# Patient Record
Sex: FEMALE | URBAN - METROPOLITAN AREA
[De-identification: names, ages, dates, MRNs, and addresses within clinical notes are randomized per-mention and may not be internally consistent; named-entity substitution may affect disease eponyms.]

---

## 2021-10-05 ENCOUNTER — WEB ENCOUNTER (OUTPATIENT)
Dept: URBAN - METROPOLITAN AREA CLINIC 37 | Facility: CLINIC | Age: 51
End: 2021-10-05

## 2021-10-21 PROBLEM — 235871004 HEPATITIS B CARRIER: Status: ACTIVE | Noted: 2021-10-21

## 2021-11-03 ENCOUNTER — TELEPHONE ENCOUNTER (OUTPATIENT)
Dept: URBAN - METROPOLITAN AREA CLINIC 35 | Facility: CLINIC | Age: 51
End: 2021-11-03

## 2021-11-04 ENCOUNTER — LAB OUTSIDE AN ENCOUNTER (OUTPATIENT)
Dept: URBAN - METROPOLITAN AREA CLINIC 37 | Facility: CLINIC | Age: 51
End: 2021-11-04

## 2021-11-04 ENCOUNTER — OFFICE VISIT (OUTPATIENT)
Dept: URBAN - METROPOLITAN AREA CLINIC 37 | Facility: CLINIC | Age: 51
End: 2021-11-04

## 2021-11-04 VITALS
WEIGHT: 92 LBS | OXYGEN SATURATION: 98 % | SYSTOLIC BLOOD PRESSURE: 98 MMHG | HEIGHT: 60 IN | HEART RATE: 81 BPM | DIASTOLIC BLOOD PRESSURE: 72 MMHG | BODY MASS INDEX: 18.06 KG/M2

## 2021-11-04 PROBLEM — 275978004 SCREENING FOR MALIGNANT NEOPLASM OF COLON: Status: ACTIVE | Noted: 2021-11-04

## 2021-11-04 RX ORDER — EMPAGLIFLOZIN 10 MG/1
1 TABLET TABLET, FILM COATED ORAL ONCE A DAY
Qty: 30 | Status: ACTIVE | COMMUNITY

## 2021-11-04 RX ORDER — POLYETHYLENE GLYCOL 3350, SODIUM SULFATE, SODIUM CHLORIDE, POTASSIUM CHLORIDE, ASCORBIC ACID, SODIUM ASCORBATE 140-9-5.2G
AS DIRECTED KIT ORAL ONCE
Qty: 1 KIT | Refills: 0 | OUTPATIENT
Start: 2021-11-04

## 2021-11-04 RX ORDER — ENTECAVIR 0.5 MG/1
1 TABLET ON AN EMPTY STOMACH TABLET, FILM COATED ORAL ONCE A DAY
Qty: 10 | Status: ACTIVE | COMMUNITY

## 2021-11-04 RX ORDER — PRAVASTATIN SODIUM 40 MG/1
1 TABLET TABLET ORAL QHS
Status: ACTIVE | COMMUNITY

## 2021-11-04 NOTE — HPI-MIGRATED HPI
Colorectal cancer screening : Patients denies -> rectal bleeding, change in bowel habits, constipation, diarrhea, or abdominal pain;   Colorectal cancer screening : Current bowel movement patterns -> 1 formed BM daily;   Colorectal cancer screening : Patient is here for initial consultation for -> first time screening colonoscopy;   Colorectal cancer screening : Family history of GI malignancy: -> Denies;   Hep- B : Patient currently has been on -> Entecavir 0.5MG daily since 10/2008;   Hep- B : Family history of liver or GI malignancy -> denies;   Hep- B : Patient is here for -> Initial consultation for hepatitis B;   Hep- B : Diagnosis was made -> years ago on routine labs.  Patient was previously following up with PCP, Dr. Fe Balderas but he retired and started following up with Dr. Colin Castro;   Hep- B : Patient denies any symptoms of -> jaundice, ascites/abd. distension, peripheral edema/swelling in legs, hematemesis or melena, weight loss, change in stool color, shortness of breath, fever, fatigue, depression/Anxiety, nausea/vomiting;   Hep- B : Prior Imaging studies showed: -> No cirrhosis on previous US (several years ago);   Hep- B : Patient denies/admits using supplemental herbal -> denies using supplemental herbal products;   Initial consultation : Patient is here for -> GERD Onset of symptoms: a few months ago Patient reports heartburn/acid reflux symptoms Aggravating factors: worse when she skips meals Associated symptoms: Patient denies early satiety, nausea, vomiting, changes in BM, constipation, diarrhea or dysphagia/odynophagia.  Medications tried: Patient hasn't tried any OTC antacids/PPI Tests/evaluations done previously: Patient denies prior EGD ;   Interim investigations : Labs done on: ->  * 09/05/2020 with Dr. Fe Balderas:  - Lipid: Cholesterol, total: 190; HDL: 66; TrigkyL 76; LDL: 107 (H);  - CMP: Glu: 255 (H); BUN: 12; Cr: 0.60; Na; 135; K: 4.7; Alb: 4.2; ALT: 11; AST: 14; ALP: 75; Tbili: 0.6 - Hgb A1c 11.1 (H) - CBC: WBC: 5.0; RBC: 4.81; Hgb: 14.4; Hct: 44.2; Plt: 312 - HBV DNA: &lt; 10 IU/mL;     Colorectal cancer screening : Denies dysphagia or odynophagia Currently taking anticoagulants/NSAIDs: Denies;

## 2021-11-24 ENCOUNTER — OFFICE VISIT (OUTPATIENT)
Dept: URBAN - METROPOLITAN AREA SURGERY CENTER 8 | Facility: SURGERY CENTER | Age: 51
End: 2021-11-24

## 2021-12-03 ENCOUNTER — OFFICE VISIT (OUTPATIENT)
Dept: URBAN - METROPOLITAN AREA SURGERY CENTER 8 | Facility: SURGERY CENTER | Age: 51
End: 2021-12-03

## 2021-12-28 ENCOUNTER — OFFICE VISIT (OUTPATIENT)
Dept: URBAN - METROPOLITAN AREA CLINIC 38 | Facility: CLINIC | Age: 51
End: 2021-12-28
Payer: COMMERCIAL

## 2021-12-28 DIAGNOSIS — B18.1 CARRIER OF HEPATITIS B: ICD-10-CM

## 2021-12-28 PROCEDURE — 76700 US EXAM ABDOM COMPLETE: CPT | Performed by: INTERNAL MEDICINE

## 2021-12-29 PROBLEM — 235595009: Status: ACTIVE | Noted: 2021-11-04

## 2021-12-29 PROBLEM — 428283002 HISTORY OF POLYP OF COLON (SITUATION): Status: ACTIVE | Noted: 2021-12-29

## 2021-12-29 PROBLEM — 196731005 GASTRODUODENITIS: Status: ACTIVE | Noted: 2021-12-29

## 2021-12-30 ENCOUNTER — OFFICE VISIT (OUTPATIENT)
Dept: URBAN - METROPOLITAN AREA CLINIC 37 | Facility: CLINIC | Age: 51
End: 2021-12-30
Payer: COMMERCIAL

## 2021-12-30 ENCOUNTER — LAB OUTSIDE AN ENCOUNTER (OUTPATIENT)
Dept: URBAN - METROPOLITAN AREA CLINIC 37 | Facility: CLINIC | Age: 51
End: 2021-12-30

## 2021-12-30 VITALS — HEIGHT: 60 IN | WEIGHT: 91 LBS | BODY MASS INDEX: 17.87 KG/M2

## 2021-12-30 DIAGNOSIS — K29.70 GASTRITIS, UNSPECIFIED, WITHOUT BLEEDING: ICD-10-CM

## 2021-12-30 DIAGNOSIS — K63.5 POLYP OF COLON: ICD-10-CM

## 2021-12-30 DIAGNOSIS — Z86.010 PERSONAL HISTORY OF COLONIC POLYPS: ICD-10-CM

## 2021-12-30 DIAGNOSIS — K64.0 FIRST DEGREE HEMORRHOIDS: ICD-10-CM

## 2021-12-30 DIAGNOSIS — B18.1 CHRONIC VIRAL HEPATITIS B WITHOUT DELTA-AGENT: ICD-10-CM

## 2021-12-30 PROCEDURE — 99213 OFFICE O/P EST LOW 20 MIN: CPT | Performed by: INTERNAL MEDICINE

## 2021-12-30 RX ORDER — PRAVASTATIN SODIUM 40 MG/1
1 TABLET TABLET ORAL QHS
Status: ACTIVE | COMMUNITY

## 2021-12-30 RX ORDER — POLYETHYLENE GLYCOL 3350, SODIUM SULFATE, SODIUM CHLORIDE, POTASSIUM CHLORIDE, ASCORBIC ACID, SODIUM ASCORBATE 140-9-5.2G
AS DIRECTED KIT ORAL ONCE
Qty: 1 KIT | Refills: 0 | Status: ON HOLD | COMMUNITY
Start: 2021-11-04

## 2021-12-30 RX ORDER — EMPAGLIFLOZIN 10 MG/1
1 TABLET TABLET, FILM COATED ORAL ONCE A DAY
Qty: 30 | Status: ACTIVE | COMMUNITY

## 2021-12-30 RX ORDER — ENTECAVIR 0.5 MG/1
1 TABLET ON AN EMPTY STOMACH TABLET, FILM COATED ORAL ONCE A DAY
Qty: 10 | Status: ACTIVE | COMMUNITY

## 2022-04-21 PROBLEM — 186639003 CHRONIC VIRAL HEPATITIS B WITHOUT DELTA-AGENT: Status: ACTIVE | Noted: 2021-11-03

## 2022-06-07 ENCOUNTER — OFFICE VISIT (OUTPATIENT)
Dept: URBAN - METROPOLITAN AREA CLINIC 38 | Facility: CLINIC | Age: 52
End: 2022-06-07

## 2022-06-30 ENCOUNTER — LAB OUTSIDE AN ENCOUNTER (OUTPATIENT)
Dept: URBAN - METROPOLITAN AREA CLINIC 37 | Facility: CLINIC | Age: 52
End: 2022-06-30

## 2022-06-30 ENCOUNTER — OFFICE VISIT (OUTPATIENT)
Dept: URBAN - METROPOLITAN AREA CLINIC 37 | Facility: CLINIC | Age: 52
End: 2022-06-30

## 2022-07-19 ENCOUNTER — OFFICE VISIT (OUTPATIENT)
Dept: URBAN - METROPOLITAN AREA CLINIC 35 | Facility: CLINIC | Age: 52
End: 2022-07-19

## 2023-12-22 ENCOUNTER — OFFICE VISIT (OUTPATIENT)
Dept: URBAN - METROPOLITAN AREA CLINIC 37 | Facility: CLINIC | Age: 53
End: 2023-12-22
Payer: COMMERCIAL

## 2023-12-22 VITALS
SYSTOLIC BLOOD PRESSURE: 114 MMHG | DIASTOLIC BLOOD PRESSURE: 70 MMHG | HEIGHT: 60 IN | BODY MASS INDEX: 17.28 KG/M2 | WEIGHT: 88 LBS

## 2023-12-22 DIAGNOSIS — K64.0 FIRST DEGREE HEMORRHOIDS: ICD-10-CM

## 2023-12-22 DIAGNOSIS — K63.5 POLYP OF COLON: ICD-10-CM

## 2023-12-22 DIAGNOSIS — Z86.010 PERSONAL HISTORY OF COLONIC POLYPS: ICD-10-CM

## 2023-12-22 DIAGNOSIS — B18.1 CHRONIC VIRAL HEPATITIS B WITHOUT DELTA-AGENT: ICD-10-CM

## 2023-12-22 DIAGNOSIS — K29.70 GASTRITIS, UNSPECIFIED, WITHOUT BLEEDING: ICD-10-CM

## 2023-12-22 PROCEDURE — 99214 OFFICE O/P EST MOD 30 MIN: CPT | Performed by: NURSE PRACTITIONER

## 2023-12-22 RX ORDER — METFORMIN HYDROCHLORIDE 1000 MG/1
1 TABLET WITH A MEAL TABLET, FILM COATED ORAL ONCE A DAY
Status: ACTIVE | COMMUNITY

## 2023-12-22 RX ORDER — ENTECAVIR 0.5 MG/1
1 TABLET ON AN EMPTY STOMACH TABLET, FILM COATED ORAL ONCE A DAY
Qty: 10 | Status: ACTIVE | COMMUNITY

## 2023-12-22 RX ORDER — MULTIVIT-MIN/FERROUS GLUCONATE 9 MG/15 ML
AS DIRECTED LIQUID (ML) ORAL
Status: ACTIVE | COMMUNITY

## 2023-12-22 RX ORDER — TENOFOVIR ALAFENAMIDE 25 MG/1
1 TABLET WITH FOOD TABLET ORAL ONCE A DAY
Qty: 30 TABLET | Refills: 5 | OUTPATIENT
Start: 2023-12-22 | End: 2024-06-19

## 2023-12-22 RX ORDER — SITAGLIPTIN 100 MG/1
1 TABLET TABLET, FILM COATED ORAL ONCE A DAY
Status: ACTIVE | COMMUNITY

## 2023-12-22 NOTE — HPI-HEP B
Patient is here for re-established care for Hepatitis B.  Last OV with us was 12/2021. She was supposed to follow up every 6 months for Hep B but she lost her f/u with us since then

## 2023-12-29 ENCOUNTER — LAB OUTSIDE AN ENCOUNTER (OUTPATIENT)
Dept: URBAN - METROPOLITAN AREA CLINIC 37 | Facility: CLINIC | Age: 53
End: 2023-12-29

## 2024-01-29 ENCOUNTER — TELEPHONE ENCOUNTER (OUTPATIENT)
Dept: URBAN - METROPOLITAN AREA CLINIC 35 | Facility: CLINIC | Age: 54
End: 2024-01-29

## 2024-02-13 ENCOUNTER — LAB (OUTPATIENT)
Dept: URBAN - METROPOLITAN AREA CLINIC 35 | Facility: CLINIC | Age: 54
End: 2024-02-13

## 2024-04-01 ENCOUNTER — LAB (OUTPATIENT)
Dept: URBAN - METROPOLITAN AREA CLINIC 37 | Facility: CLINIC | Age: 54
End: 2024-04-01

## 2024-05-20 ENCOUNTER — LAB OUTSIDE AN ENCOUNTER (OUTPATIENT)
Dept: URBAN - METROPOLITAN AREA CLINIC 37 | Facility: CLINIC | Age: 54
End: 2024-05-20

## 2024-05-20 ENCOUNTER — CLAIMS CREATED FROM THE CLAIM WINDOW (OUTPATIENT)
Dept: URBAN - METROPOLITAN AREA CLINIC 37 | Facility: CLINIC | Age: 54
End: 2024-05-20

## 2024-05-20 ENCOUNTER — DASHBOARD ENCOUNTERS (OUTPATIENT)
Age: 54
End: 2024-05-20

## 2024-05-20 ENCOUNTER — OFFICE VISIT (OUTPATIENT)
Dept: URBAN - METROPOLITAN AREA CLINIC 37 | Facility: CLINIC | Age: 54
End: 2024-05-20
Payer: COMMERCIAL

## 2024-05-20 VITALS — DIASTOLIC BLOOD PRESSURE: 65 MMHG | HEART RATE: 76 BPM | SYSTOLIC BLOOD PRESSURE: 112 MMHG | WEIGHT: 89 LBS

## 2024-05-20 DIAGNOSIS — Z11.59 NEED FOR HEPATITIS C SCREENING TEST: ICD-10-CM

## 2024-05-20 DIAGNOSIS — K64.0 FIRST DEGREE HEMORRHOIDS: ICD-10-CM

## 2024-05-20 DIAGNOSIS — K29.60 OTHER GASTRITIS WITHOUT BLEEDING: ICD-10-CM

## 2024-05-20 DIAGNOSIS — B18.1 CHRONIC VIRAL HEPATITIS B WITHOUT DELTA-AGENT: ICD-10-CM

## 2024-05-20 PROCEDURE — 99213 OFFICE O/P EST LOW 20 MIN: CPT | Performed by: NURSE PRACTITIONER

## 2024-05-20 RX ORDER — TENOFOVIR ALAFENAMIDE 25 MG/1
1 TABLET WITH FOOD TABLET ORAL ONCE A DAY
Qty: 30 TABLET | Refills: 5 | Status: ACTIVE | COMMUNITY
Start: 2023-12-22 | End: 2024-06-19

## 2024-05-20 RX ORDER — TENOFOVIR ALAFENAMIDE 25 MG/1
1 TABLET WITH FOOD TABLET ORAL ONCE A DAY
Qty: 30 TABLET | Refills: 5 | OUTPATIENT
Start: 2024-05-20 | End: 2024-11-16

## 2024-05-20 RX ORDER — SITAGLIPTIN 100 MG/1
1 TABLET TABLET, FILM COATED ORAL ONCE A DAY
Status: ACTIVE | COMMUNITY

## 2024-05-20 RX ORDER — METFORMIN HYDROCHLORIDE 1000 MG/1
1 TABLET WITH A MEAL TABLET, FILM COATED ORAL ONCE A DAY
Status: ACTIVE | COMMUNITY

## 2024-06-17 ENCOUNTER — LAB OUTSIDE AN ENCOUNTER (OUTPATIENT)
Dept: URBAN - METROPOLITAN AREA CLINIC 37 | Facility: CLINIC | Age: 54
End: 2024-06-17

## 2024-08-18 ENCOUNTER — LAB OUTSIDE AN ENCOUNTER (OUTPATIENT)
Dept: URBAN - METROPOLITAN AREA CLINIC 37 | Facility: CLINIC | Age: 54
End: 2024-08-18

## 2024-09-16 ENCOUNTER — LAB OUTSIDE AN ENCOUNTER (OUTPATIENT)
Dept: URBAN - METROPOLITAN AREA CLINIC 35 | Facility: CLINIC | Age: 54
End: 2024-09-16

## 2024-09-18 ENCOUNTER — OFFICE VISIT (OUTPATIENT)
Dept: URBAN - METROPOLITAN AREA CLINIC 35 | Facility: CLINIC | Age: 54
End: 2024-09-18

## 2024-09-18 ENCOUNTER — OFFICE VISIT (OUTPATIENT)
Dept: URBAN - METROPOLITAN AREA CLINIC 35 | Facility: CLINIC | Age: 54
End: 2024-09-18
Payer: COMMERCIAL

## 2024-09-18 VITALS — WEIGHT: 98 LBS

## 2024-09-18 DIAGNOSIS — Z86.010 PERSONAL HISTORY OF COLONIC POLYPS: ICD-10-CM

## 2024-09-18 DIAGNOSIS — B18.1 CHRONIC VIRAL HEPATITIS B WITHOUT DELTA-AGENT: ICD-10-CM

## 2024-09-18 DIAGNOSIS — K64.0 FIRST DEGREE HEMORRHOIDS: ICD-10-CM

## 2024-09-18 PROCEDURE — 99214 OFFICE O/P EST MOD 30 MIN: CPT | Performed by: NURSE PRACTITIONER

## 2024-09-18 RX ORDER — SITAGLIPTIN 100 MG/1
1 TABLET TABLET, FILM COATED ORAL ONCE A DAY
Status: ACTIVE | COMMUNITY

## 2024-09-18 RX ORDER — TENOFOVIR ALAFENAMIDE 25 MG/1
1 TABLET WITH FOOD TABLET ORAL ONCE A DAY
Qty: 30 TABLET | Refills: 5 | Status: ACTIVE | COMMUNITY
Start: 2024-05-20 | End: 2024-11-16

## 2024-09-18 RX ORDER — TENOFOVIR ALAFENAMIDE 25 MG/1
1 TABLET WITH FOOD TABLET ORAL ONCE A DAY
Qty: 30 TABLET | Refills: 5 | OUTPATIENT

## 2024-09-18 RX ORDER — METFORMIN HYDROCHLORIDE 1000 MG/1
1 TABLET WITH A MEAL TABLET, FILM COATED ORAL ONCE A DAY
Status: ACTIVE | COMMUNITY

## 2024-09-18 NOTE — HPI-HEP B
Pt is here today and mentioned that in her last visit with her PCP, she was told that her viral load was disappearred and she no longer needs to treat for Hep B and that she should treat for Hep. However, no records proving that she has undetected viral load or normal ALT. Her last labs with us in 03/2024 showed ALT: 64 and viral load of more than 4 millions copies/ml

## 2024-09-23 LAB
BCP MUTATIONS: DETECTED
HBV GENOTYPE:: (no result)
POLYMERASE MUTATIONS:: DETECTED
PRECORE MUTATION: NOT DETECTED

## 2025-02-24 ENCOUNTER — LAB OUTSIDE AN ENCOUNTER (OUTPATIENT)
Dept: URBAN - METROPOLITAN AREA CLINIC 35 | Facility: CLINIC | Age: 55
End: 2025-02-24

## 2025-03-03 ENCOUNTER — LAB OUTSIDE AN ENCOUNTER (OUTPATIENT)
Dept: URBAN - METROPOLITAN AREA CLINIC 35 | Facility: CLINIC | Age: 55
End: 2025-03-03

## 2025-03-20 ENCOUNTER — LAB OUTSIDE AN ENCOUNTER (OUTPATIENT)
Dept: URBAN - METROPOLITAN AREA CLINIC 37 | Facility: CLINIC | Age: 55
End: 2025-03-20

## 2025-03-20 ENCOUNTER — OFFICE VISIT (OUTPATIENT)
Dept: URBAN - METROPOLITAN AREA CLINIC 37 | Facility: CLINIC | Age: 55
End: 2025-03-20
Payer: COMMERCIAL

## 2025-03-20 VITALS
DIASTOLIC BLOOD PRESSURE: 72 MMHG | WEIGHT: 91 LBS | HEART RATE: 79 BPM | BODY MASS INDEX: 17.77 KG/M2 | SYSTOLIC BLOOD PRESSURE: 118 MMHG

## 2025-03-20 DIAGNOSIS — B18.1 CHRONIC VIRAL HEPATITIS B WITHOUT DELTA-AGENT: ICD-10-CM

## 2025-03-20 DIAGNOSIS — Z86.010 PERSONAL HISTORY OF COLONIC POLYPS: ICD-10-CM

## 2025-03-20 DIAGNOSIS — K29.70 GASTRITIS, UNSPECIFIED, WITHOUT BLEEDING: ICD-10-CM

## 2025-03-20 DIAGNOSIS — Z11.59 NEED FOR HEPATITIS C SCREENING TEST: ICD-10-CM

## 2025-03-20 DIAGNOSIS — K64.0 FIRST DEGREE HEMORRHOIDS: ICD-10-CM

## 2025-03-20 PROCEDURE — 99214 OFFICE O/P EST MOD 30 MIN: CPT | Performed by: NURSE PRACTITIONER

## 2025-03-20 RX ORDER — ORAL SEMAGLUTIDE 7 MG/1
TABLET ORAL
Qty: 30 TABLET | Status: ACTIVE | COMMUNITY

## 2025-03-20 RX ORDER — TENOFOVIR ALAFENAMIDE 25 MG/1
1 TABLET WITH FOOD TABLET ORAL ONCE A DAY
Qty: 30 TABLET | Refills: 5

## 2025-03-20 RX ORDER — PRAVASTATIN SODIUM 40 MG/1
TABLET ORAL
Qty: 90 TABLET | Status: ACTIVE | COMMUNITY

## 2025-03-20 RX ORDER — METFORMIN HYDROCHLORIDE 1000 MG/1
1 TABLET WITH A MEAL TABLET, FILM COATED ORAL ONCE A DAY
Status: ACTIVE | COMMUNITY

## 2025-03-20 RX ORDER — LOSARTAN POTASSIUM AND HYDROCHLOROTHIAZIDE 50; 12.5 MG/1; MG/1
1 TABLET TABLET, FILM COATED ORAL ONCE A DAY
Status: ACTIVE | COMMUNITY

## 2025-03-20 RX ORDER — TENOFOVIR ALAFENAMIDE 25 MG/1
1 TABLET WITH FOOD TABLET ORAL ONCE A DAY
Qty: 30 TABLET | Refills: 5 | Status: ACTIVE | COMMUNITY

## 2025-03-20 NOTE — PHYSICAL EXAM CHEST:
no lesions, no deformities, no traumatic injuries, no significant scars are present, chest wall non-tender, no masses present, breathing is unlabored without accessory muscle use,normal breath sounds Loss